# Patient Record
Sex: FEMALE | Race: WHITE
[De-identification: names, ages, dates, MRNs, and addresses within clinical notes are randomized per-mention and may not be internally consistent; named-entity substitution may affect disease eponyms.]

---

## 2017-05-15 ENCOUNTER — HOSPITAL ENCOUNTER (OUTPATIENT)
Dept: HOSPITAL 80 - FIMAGING | Age: 72
End: 2017-05-15
Attending: FAMILY MEDICINE
Payer: COMMERCIAL

## 2017-05-15 DIAGNOSIS — R14.0: ICD-10-CM

## 2017-05-15 DIAGNOSIS — R10.13: Primary | ICD-10-CM

## 2017-05-15 DIAGNOSIS — Z87.42: ICD-10-CM

## 2017-05-17 ENCOUNTER — HOSPITAL ENCOUNTER (OUTPATIENT)
Dept: HOSPITAL 80 - FIMAGING | Age: 72
End: 2017-05-17
Attending: FAMILY MEDICINE
Payer: COMMERCIAL

## 2017-05-17 DIAGNOSIS — R14.0: Primary | ICD-10-CM

## 2017-05-17 DIAGNOSIS — M54.5: ICD-10-CM

## 2017-05-24 ENCOUNTER — HOSPITAL ENCOUNTER (EMERGENCY)
Dept: HOSPITAL 80 - FED | Age: 72
Discharge: HOME | End: 2017-05-24
Payer: COMMERCIAL

## 2017-05-24 VITALS — SYSTOLIC BLOOD PRESSURE: 122 MMHG | TEMPERATURE: 97.7 F | HEART RATE: 76 BPM | DIASTOLIC BLOOD PRESSURE: 75 MMHG

## 2017-05-24 VITALS — RESPIRATION RATE: 16 BRPM | OXYGEN SATURATION: 98 %

## 2017-05-24 DIAGNOSIS — R10.9: Primary | ICD-10-CM

## 2017-05-24 LAB
BACTERIA #/AREA URNS HPF: (no result) /HPF
COLOR UR: (no result)
MUCOUS THREADS #/AREA URNS LPF: (no result) /LPF
NITRITE UR QL STRIP: NEGATIVE
PH UR STRIP: 8 [PH] (ref 5–7.5)
RBC #/AREA URNS HPF: (no result) /HPF (ref 0–3)
SP GR UR STRIP: 1.02 (ref 1–1.03)
WBC #/AREA URNS HPF: (no result) /HPF (ref 0–3)

## 2017-05-24 NOTE — EDPHY
H & P


Time Seen by Provider: 05/24/17 18:47


HPI/ROS: 





HPI


Lower abdominal pain.





71-year-old female by private vehicle with her .  This patient reports 

that yesterday evening she felt bloated and felt like her lower abdomen was 

mildly distended.  She had some flatulence as well.  She reports that this 

continued this morning.  She reports that 2:00 p.m. she developed bilateral 

lower abdominal pain which she described as sharp and radiating somewhat to her 

back.  She describes the pain is symmetric on both sides.  She came to the 

emergency department for evaluation.  While in triage she had some nausea with 

1 episode of vomiting.  She reports that after she vomited she felt much 

better.  On my evaluation she denies any pain at this time.  Last bowel 

movement was at 1:30 p.m. today.  No bloody or melenic stool.





ROS:





Constitutional:  No fever, no chills.  No weakness.


Eyes:  No discharge.  No changes in vision.


ENT:  No sore throat.  No nasal congestion or rhinorrhea.


Respiratory:  No cough.  No shortness of breath.


Cardiac:  No chest pain, no palpitations.


Gastrointestinal:  As above, no diarrhea.


Genitourinary:  No hematuria.  No dysuria or increased frequency with urination.


Musculoskeletal:  No back pain.  No neck pain.  No myalgias or arthralgias.


Skin:  No rashes.


Neurological:  No headache.  No focal weakness or altered sensation.





Past medical history:  Endometriosis.  She has had a laparotomy for this.





Social history:  Here with her .  Nonsmoker.  No alcohol.





Physical Exam:





General Appearance:  Alert, no distress.  This patient is responding to 

questions appropriately and in full sentences.  This patient appears well-

hydrated and well-nourished.


Eyes:  Pupils equal and round no pallor or injection.  No lid edema, erythema 

or injection.


Respiratory:  There are no retractions, lungs are clear to auscultation with 

good air movement bilaterally.


Cardiovascular:  Regular rate and rhythm.  No murmur.


Gastrointestinal:  Abdomen is soft and nontender, no masses, bowel sounds 

normal.  No focal tenderness at McBurney's point.  No Pa sign.


Neurological:  Motor sensory function is grossly intact.  Cranial nerves are 

normal.  Gait is normal.


Skin:  Warm and dry, no rashes.


Musculoskeletal:  No CVA tenderness on palpation.


Extremities are symmetrical.  All joints range without pain or impingement.


Psychiatric:  No agitation.  No depression.





Database:





EKG:





Imaging:





Procedures:





Emergency department course:





Vital signs have been reviewed.  Urinalysis obtained and is unremarkable.  The 

patient has benign abdominal exam.  She has no complaints of abdominal pain at 

this time.  She does feel comfortable going home.  I feel this is reasonable.  

Follow-up and return to emergency department precautions reviewed with her.  

All of her questions were answered.  She was discharged in good condition with 

her .





Differential Diagnosis:





The differential diagnosis on this patient includes but is not limited to 

constipation, bowel gas.  Appendicitis, volvulus, ovarian torsion, unlikely.  

This represents a partial list of diagnoses considered.  These considerations 

are based on history, physical exam, past history, reassessment and diagnostic 

testing.


Constitutional: 





 Initial Vital Signs











Heart Rate  67   05/24/17 19:27


 


Respiratory Rate  16   05/24/17 19:27


 


Blood Pressure  118/77   05/24/17 19:27


 


O2 Sat (%)  98   05/24/17 19:27








 











O2 Delivery Mode               Room Air














Allergies/Adverse Reactions: 


 





nitrofurantoin [From Macrobid] Allergy (Verified 05/24/17 19:39)


 


Sulfa (Sulfonamide Antibiotics) Allergy (Verified 05/24/17 19:39)


 











Medical Decision Making





- Data Points


Laboratory Results: 





 











  05/24/17





  19:25


 


Urine Color  ALEXANDRA 





  


 


Urine Appearance  MODERATELY TURBID 





  


 


Urine pH  8.0  H 





   (5.0-7.5) 


 


Ur Specific Gravity  1.017 





   (1.002-1.030) 


 


Urine Protein  1+  H 





   (NEGATIVE) 


 


Urine Ketones  1+  H 





   (NEGATIVE) 


 


Urine Blood  NEGATIVE 





   (NEGATIVE) 


 


Urine Nitrate  NEGATIVE 





   (NEGATIVE) 


 


Urine Bilirubin  NEGATIVE 





   (NEGATIVE) 


 


Urine Urobilinogen  NEGATIVE EU EU





   (0.2-1.0) 


 


Ur Leukocyte Esterase  NEGATIVE 





   (NEGATIVE) 


 


Urine RBC  3-5 /hpf H /hpf





   (0-3) 


 


Urine WBC  1-3 /hpf /hpf





   (0-3) 


 


Ur Epithelial Cells  TRACE /lpf /lpf





   (NONE-1+) 


 


Urine Bacteria  TRACE /hpf H /hpf





   (NONE SEEN) 


 


Urine Mucus  TRACE /lpf /lpf





   (NONE-1+) 


 


Urine Glucose  NEGATIVE 





   (NEGATIVE) 














Departure





- Departure


Disposition: Home, Routine, Self-Care


Clinical Impression: 


 Resolved abdominal pain





Condition: Good


Instructions:  Acute Abdominal Pain (ED)


Additional Instructions: 


Read and follow provided instructions.





Follow-up with your primary care physician in 1-2 days for re-evaluation as 

needed.





Return to the emergency department for return of abdominal pain, vomiting, 

bloody stool or other serious concerns.


Referrals: 


Nicolle Calloway MD [Primary Care Provider] - As per Instructions

## 2017-08-19 ENCOUNTER — HOSPITAL ENCOUNTER (OUTPATIENT)
Dept: HOSPITAL 80 - FIMAGING | Age: 72
End: 2017-08-19
Attending: FAMILY MEDICINE
Payer: COMMERCIAL

## 2017-08-19 DIAGNOSIS — Z82.62: ICD-10-CM

## 2017-08-19 DIAGNOSIS — Z13.820: Primary | ICD-10-CM

## 2017-08-19 DIAGNOSIS — Z78.0: ICD-10-CM

## 2017-08-19 DIAGNOSIS — M54.5: ICD-10-CM

## 2017-08-19 DIAGNOSIS — M81.0: ICD-10-CM

## 2017-09-01 ENCOUNTER — HOSPITAL ENCOUNTER (OUTPATIENT)
Dept: HOSPITAL 80 - BHFA | Age: 72
End: 2017-09-01
Attending: INTERNAL MEDICINE
Payer: COMMERCIAL

## 2017-09-01 DIAGNOSIS — R07.9: Primary | ICD-10-CM

## 2017-09-01 DIAGNOSIS — R06.00: ICD-10-CM

## 2017-09-12 ENCOUNTER — HOSPITAL ENCOUNTER (EMERGENCY)
Dept: HOSPITAL 80 - FED | Age: 72
Discharge: HOME | End: 2017-09-12
Payer: COMMERCIAL

## 2017-09-12 VITALS
OXYGEN SATURATION: 95 % | DIASTOLIC BLOOD PRESSURE: 57 MMHG | SYSTOLIC BLOOD PRESSURE: 108 MMHG | TEMPERATURE: 97.9 F | HEART RATE: 59 BPM

## 2017-09-12 VITALS — RESPIRATION RATE: 16 BRPM

## 2017-09-12 DIAGNOSIS — E86.9: ICD-10-CM

## 2017-09-12 DIAGNOSIS — R05: Primary | ICD-10-CM

## 2017-09-12 LAB
% IMMATURE GRANULYOCYTES: 0.2 % (ref 0–1.1)
ABSOLUTE IMMATURE GRANULOCYTES: 0.01 10^3/UL (ref 0–0.1)
ABSOLUTE NRBC COUNT: 0 10^3/UL (ref 0–0.01)
ADD DIFF?: NO
ADD MORPH?: NO
ADD SCAN?: NO
ANION GAP SERPL CALC-SCNC: 10 MEQ/L (ref 8–16)
ATYPICAL LYMPHOCYTE FLAG: 10 (ref 0–99)
CALCIUM SERPL-MCNC: 9.3 MG/DL (ref 8.5–10.4)
CHLORIDE SERPL-SCNC: 95 MEQ/L (ref 97–110)
CO2 SERPL-SCNC: 24 MEQ/L (ref 22–31)
CREAT SERPL-MCNC: 0.7 MG/DL (ref 0.6–1)
ERYTHROCYTE [DISTWIDTH] IN BLOOD BY AUTOMATED COUNT: 12.2 % (ref 11.5–15.2)
FRAGMENT RBC FLAG: 0 (ref 0–99)
GFR SERPL CREATININE-BSD FRML MDRD: > 60 ML/MIN/{1.73_M2}
GLUCOSE SERPL-MCNC: 101 MG/DL (ref 70–100)
HCT VFR BLD CALC: 42.1 % (ref 38–47)
HGB BLD-MCNC: 14.6 G/DL (ref 12.6–16.3)
LEFT SHIFT FLG: 0 (ref 0–99)
LIPEMIA HEMOLYSIS FLAG: 90 (ref 0–99)
MCH RBC BLDCO QN: 31.2 PG (ref 27.9–34.1)
MCHC RBC AUTO-ENTMCNC: 34.7 G/DL (ref 32.4–36.7)
MCV RBC AUTO: 90 FL (ref 81.5–99.8)
NRBC-AUTO%: 0 % (ref 0–0.2)
PLATELET # BLD: 196 10^3/UL (ref 150–400)
PLATELET CLUMPS FLAG: 0 (ref 0–99)
PMV BLD AUTO: 10.1 FL (ref 8.7–11.7)
POTASSIUM SERPL-SCNC: 4.1 MEQ/L (ref 3.5–5.2)
RBC # BLD AUTO: 4.68 10^6/UL (ref 4.18–5.33)
SODIUM SERPL-SCNC: 129 MEQ/L (ref 134–144)
TROPONIN I SERPL-MCNC: < 0.012 NG/ML (ref 0–0.03)

## 2017-09-12 NOTE — CPEKG
Heart Rate: 65

RR Interval: 923

QRSD Interval: 86

QT Interval: 412

QTC Interval: 429

QRS Axis: 67

T Wave Axis: 63

EKG Severity - ABNORMAL ECG -

EKG Impression: ACCELERATED JUNCTIONAL ESCAPE RHYTHM

Electronically Signed By: Artis Byrd 12-Sep-2017 19:55:37

## 2017-12-06 ENCOUNTER — HOSPITAL ENCOUNTER (INPATIENT)
Dept: HOSPITAL 80 - FED | Age: 72
LOS: 2 days | Discharge: HOME | DRG: 343 | End: 2017-12-08
Attending: SURGERY | Admitting: SURGERY
Payer: COMMERCIAL

## 2017-12-06 DIAGNOSIS — K40.90: ICD-10-CM

## 2017-12-06 DIAGNOSIS — K35.80: Primary | ICD-10-CM

## 2017-12-06 DIAGNOSIS — Z87.891: ICD-10-CM

## 2017-12-06 LAB
% IMMATURE GRANULYOCYTES: 0.2 % (ref 0–1.1)
ABSOLUTE IMMATURE GRANULOCYTES: 0.03 10^3/UL (ref 0–0.1)
ABSOLUTE NRBC COUNT: 0 10^3/UL (ref 0–0.01)
ADD DIFF?: NO
ADD MORPH?: NO
ADD SCAN?: NO
ALBUMIN SERPL-MCNC: 3.9 G/DL (ref 3.5–5)
ALP SERPL-CCNC: 64 IU/L (ref 38–126)
ALT SERPL-CCNC: 41 IU/L (ref 9–52)
ANION GAP SERPL CALC-SCNC: 14 MEQ/L (ref 8–16)
AST SERPL-CCNC: 24 IU/L (ref 14–46)
ATYPICAL LYMPHOCYTE FLAG: 0 (ref 0–99)
BILIRUB SERPL-MCNC: 1.3 MG/DL (ref 0.1–1.4)
CALCIUM SERPL-MCNC: 9.2 MG/DL (ref 8.5–10.4)
CHLORIDE SERPL-SCNC: 102 MEQ/L (ref 97–110)
CO2 SERPL-SCNC: 20 MEQ/L (ref 22–31)
CREAT SERPL-MCNC: 0.7 MG/DL (ref 0.6–1)
ERYTHROCYTE [DISTWIDTH] IN BLOOD BY AUTOMATED COUNT: 13 % (ref 11.5–15.2)
FRAGMENT RBC FLAG: 0 (ref 0–99)
GFR SERPL CREATININE-BSD FRML MDRD: > 60 ML/MIN/{1.73_M2}
GLUCOSE SERPL-MCNC: 115 MG/DL (ref 70–100)
HCT VFR BLD CALC: 43.2 % (ref 38–47)
HGB BLD-MCNC: 15.3 G/DL (ref 12.6–16.3)
LEFT SHIFT FLG: 0 (ref 0–99)
LIPEMIA HEMOLYSIS FLAG: 90 (ref 0–99)
MCH RBC BLDCO QN: 32.1 PG (ref 27.9–34.1)
MCHC RBC AUTO-ENTMCNC: 35.4 G/DL (ref 32.4–36.7)
MCV RBC AUTO: 90.8 FL (ref 81.5–99.8)
NRBC-AUTO%: 0 % (ref 0–0.2)
PLATELET # BLD: 178 10^3/UL (ref 150–400)
PLATELET CLUMPS FLAG: 0 (ref 0–99)
PMV BLD AUTO: 10.2 FL (ref 8.7–11.7)
POTASSIUM SERPL-SCNC: 4 MEQ/L (ref 3.5–5.2)
PROT SERPL-MCNC: 6.4 G/DL (ref 6.3–8.2)
RBC # BLD AUTO: 4.76 10^6/UL (ref 4.18–5.33)
SODIUM SERPL-SCNC: 136 MEQ/L (ref 134–144)

## 2017-12-06 PROCEDURE — G0378 HOSPITAL OBSERVATION PER HR: HCPCS

## 2017-12-06 RX ADMIN — SODIUM CHLORIDE SCH MLS: 900 INJECTION, SOLUTION INTRAVENOUS at 16:50

## 2017-12-06 RX ADMIN — KETOROLAC TROMETHAMINE SCH MG: 15 INJECTION, SOLUTION INTRAMUSCULAR; INTRAVENOUS at 17:33

## 2017-12-06 RX ADMIN — KETOROLAC TROMETHAMINE SCH: 15 INJECTION, SOLUTION INTRAMUSCULAR; INTRAVENOUS at 16:45

## 2017-12-06 RX ADMIN — SODIUM CHLORIDE SCH MLS: 900 INJECTION, SOLUTION INTRAVENOUS at 10:53

## 2017-12-06 RX ADMIN — ACETAMINOPHEN SCH: 325 TABLET ORAL at 16:45

## 2017-12-06 RX ADMIN — ACETAMINOPHEN SCH MG: 325 TABLET ORAL at 18:27

## 2017-12-06 NOTE — POSTOPPROG
Post Op Note


Date of Operation: 12/06/17


Surgeon: Ras Garcia


Anesthesia: GET(General Endotracheal), LMA


Pre-op Diagnosis: acute appendicitis


Post-op Diagnosis: acute appendicitis with small right inguinal hernia


Indication: acute appendicitis


Procedure: laparoscopic appendectomy


Findings: acute appendicitis with small right inguinal hernia


Inf/Abcess present in the surg proc area at time of surgery?: No


EBL: Minimal


Total fluids administered: 1200cc in OR 1500 cc in ER


Complications: 





none


Drains: Ernie Veras


Specimen(s): 





appendix


peritoneal fluid for culture

## 2017-12-06 NOTE — EDPHY
H & P


Time Seen by Provider: 12/06/17 07:45


HPI/ROS: 





CHIEF COMPLAINT:  Right lower quadrant pain





HISTORY OF PRESENT ILLNESS:  72-year-old female with a history of endometriosis 

presents with right lower quadrant pain.  Onset of right lower quadrant pain 

yesterday morning.  The pain is moderate and waxes and wanes.  Radiates to the 

low back.  Associated with nausea and 1 episode of vomiting.  No known fever.  

No prior similar symptoms.





REVIEW OF SYSTEMS:


Constitutional:  No fever, no chills


Eyes:  No visual changes


ENT:  No sore throat


Respiratory:  No cough, no shortness of breath


Cardiac:  No chest pain


Genitourinary:  No hematuria, no dysuria


Musculoskeletal:  No leg pain or swelling


Skin:  No rash


Neurological:  No headache, no weakness


Psychiatric:  No depression





Past Medical/Surgical History: 





Endometriosis





Social History: 











Smoking Status: Former smoker


Physical Exam: 





General Appearance:  Alert, pleasant


Eyes:  Pupils equal and round, no conjunctival pallor


ENT, Mouth:  Mucous membranes moist


Neck:  Normal inspection


Respiratory:  Lungs are clear to auscultation


Cardiovascular:  Regular rate and rhythm


Gastrointestinal:  Abdomen is soft, right lower quadrant tenderness


Neurological:  A&O, nonfocal, normal gait


Skin:  Warm and dry, no rash


Extremities:  Nontender, no pedal edema


Psychiatric:  Mood and affect normal





Constitutional: 


 Initial Vital Signs











Temperature (C)  36.6 C   12/06/17 07:16


 


Heart Rate  82   12/06/17 07:16


 


Respiratory Rate  16   12/06/17 07:16


 


Blood Pressure  98/59 L  12/06/17 07:16


 


O2 Sat (%)  97   12/06/17 07:16








 











O2 Delivery Mode               Room Air














Allergies/Adverse Reactions: 


 





nitrofurantoin [From Macrobid] Allergy (Verified 05/24/17 19:39)


 


Penicillins Allergy (Verified 12/06/17 07:15)


 


Sulfa (Sulfonamide Antibiotics) Allergy (Verified 05/24/17 19:39)


 








Home Medications: 














 Medication  Instructions  Recorded


 


NK [No Known Home Meds]  09/12/17














Medical Decision Making





- Diagnostics


Imaging Results: 


 Imaging Impressions





Abdomen CT  12/06/17 08:00


Impression: Appendicitis. 


 


Results discussed with Dr. Monahan at 9:25 am.


 


General information for patients regarding this examination can be found at 

[email or web address].


 


If you have questions or comments about this report, please contact me at [phone] (hospital) or [phone] ([facility]). 


 











ED Course/Re-evaluation: 





IV normal saline 1 L and Zofran 4 mg IV given.  Declines pain medication.  CT 

scan of the abdomen pelvis ordered to rule out appendicitis.





CT scan of the abdomen and pelvis read by Dr. Martinez reveals acute 

appendicitis.  Results discussed with the patient and her .  Invanz 1 g 

IV given.  Dr. Garcia was consulted and saw the patient in the emergency 

department.


Differential Diagnosis: 





Differential diagnosis includes though it is not limited to appendicitis, 

cholecystitis, diverticulitis, pyelonephritis, bowel perforation, small bowel 

obstruction.





- Data Points


Laboratory Results: 


 Laboratory Results





 12/06/17 07:40 





 12/06/17 07:40 





 











  12/06/17 12/06/17





  07:40 07:40


 


WBC    12.24 10^3/uL H 10^3/uL





    (3.80-9.50) 


 


RBC    4.76 10^6/uL 10^6/uL





    (4.18-5.33) 


 


Hgb    15.3 g/dL g/dL





    (12.6-16.3) 


 


Hct    43.2 % %





    (38.0-47.0) 


 


MCV    90.8 fL fL





    (81.5-99.8) 


 


MCH    32.1 pg pg





    (27.9-34.1) 


 


MCHC    35.4 g/dL g/dL





    (32.4-36.7) 


 


RDW    13.0 % %





    (11.5-15.2) 


 


Plt Count    178 10^3/uL 10^3/uL





    (150-400) 


 


MPV    10.2 fL fL





    (8.7-11.7) 


 


Neut % (Auto)    85.9 % H %





    (39.3-74.2) 


 


Lymph % (Auto)    8.3 % L %





    (15.0-45.0) 


 


Mono % (Auto)    5.2 % %





    (4.5-13.0) 


 


Eos % (Auto)    0.1 % L %





    (0.6-7.6) 


 


Baso % (Auto)    0.3 % %





    (0.3-1.7) 


 


Nucleat RBC Rel Count    0.0 % %





    (0.0-0.2) 


 


Absolute Neuts (auto)    10.51 10^3/uL H 10^3/uL





    (1.70-6.50) 


 


Absolute Lymphs (auto)    1.01 10^3/uL 10^3/uL





    (1.00-3.00) 


 


Absolute Monos (auto)    0.64 10^3/uL 10^3/uL





    (0.30-0.80) 


 


Absolute Eos (auto)    0.01 10^3/uL L 10^3/uL





    (0.03-0.40) 


 


Absolute Basos (auto)    0.04 10^3/uL 10^3/uL





    (0.02-0.10) 


 


Absolute Nucleated RBC    0.00 10^3/uL 10^3/uL





    (0-0.01) 


 


Immature Gran %    0.2 % %





    (0.0-1.1) 


 


Immature Gran #    0.03 10^3/uL 10^3/uL





    (0.00-0.10) 


 


Sodium  136 mEq/L mEq/L  





   (134-144)  


 


Potassium  4.0 mEq/L mEq/L  





   (3.5-5.2)  


 


Chloride  102 mEq/L mEq/L  





   ()  


 


Carbon Dioxide  20 mEq/l L mEq/l  





   (22-31)  


 


Anion Gap  14 mEq/L mEq/L  





   (8-16)  


 


BUN  10 mg/dL mg/dL  





   (7-23)  


 


Creatinine  0.7 mg/dL mg/dL  





   (0.6-1.0)  


 


Estimated GFR  > 60   





   


 


Glucose  115 mg/dL H mg/dL  





   ()  


 


Calcium  9.2 mg/dL mg/dL  





   (8.5-10.4)  


 


Total Bilirubin  1.3 mg/dL mg/dL  





   (0.1-1.4)  


 


AST  24 IU/L IU/L  





   (14-46)  


 


ALT  41 IU/L IU/L  





   (9-52)  


 


Alkaline Phosphatase  64 IU/L IU/L  





   ()  


 


Total Protein  6.4 g/dL g/dL  





   (6.3-8.2)  


 


Albumin  3.9 g/dL g/dL  





   (3.5-5.0)  











Medications Given: 


 








Discontinued Medications





Ertapenem (Invanz)  1 gm IVP EDNOW ONE


   PRN Reason: Protocol


   Stop: 12/06/17 09:32


   Last Admin: 12/06/17 10:07 Dose:  1 gm


Sodium Chloride (Ns)  1,000 mls @ 0 mls/hr IV EDNOW ONE; Wide Open


   PRN Reason: Protocol


   Stop: 12/06/17 07:54


   Last Admin: 12/06/17 08:37 Dose:  1,000 mls


Ondansetron HCl (Zofran)  4 mg IVP EDNOW ONE


   Stop: 12/06/17 07:54


   Last Admin: 12/06/17 08:36 Dose:  4 mg








Departure





- Departure


Disposition: [facility]


Clinical Impression: 


Acute appendicitis


Qualifiers:


 Acute appendicitis type: with localized peritonitis Qualified Code(s): K35.3 - 

Acute appendicitis with localized peritonitis





Condition: Good

## 2017-12-06 NOTE — GOP
[f 
rep st]



                                                                OPERATIVE REPORT





DATE OF OPERATION:  12/06/2017



SURGEON:  Ras Garcia MD



PREOPERATIVE DIAGNOSIS:  Acute appendicitis.



POSTOPERATIVE DIAGNOSIS:  Acute appendicitis (unruptured) with small right 
indirect inguinal hernia.



PROCEDURE PERFORMED:  



FINDINGS:  Acute unruptured appendicitis with right indirect inguinal hernia.





INDICATIONS:  Acute appendicitis.



DESCRIPTION OF PROCEDURE:  The patient was placed on the operating table in 
supine position.  She has received 1 g of Invanz.  Her allergy to "penicillin" 
is by scratch test and is thought to be more mold related. 



A surgical time-out was carried out.  The patient was carefully intubated, 
prepped and draped. 



A curvilinear incision was planned at the umbilicus.  This incision of skin was 
sharply incised.  Dissection was continued down to the anterior rectus sheath 
which was elevated between Allis clamps.  It was divided in the midline.  A 
pursestring of #0 PDS was placed.  The peritoneum was entered.  An 11-12 mm 
disposable Marybel trocar is placed.  Intra-abdominal insufflation was carried 
out to 15 mmHg.  This was done at high flow.  A 5 mm oblique left lower 
quadrant incision was made.  A vertical midline suprapubic incision is made.  
The 5 mm ports were placed to the latter 2 sites. 



There is a slightly turbid, green-yellow fluid in the pelvis.  This was 
aspirated and sent for cultures. 



The appendix was adherent to the right sidewall.  Using a Harmonic scalpel, the 
cecum was carefully mobilized.  The appendix was carefully taken down from its 
adhesions to the sidewall. 



Harmonic scalpel was used to transect the mesoappendix down to its base on the 
cecum. 



The appendix was elevated.  The appendix was transected with a small cuff of 
cecum.  It was removed in a specimen bag.  Hemostasis was excellent. 



Photographic documentation of the right inguinal hernia, both ovaries and the 
uterus was carried out.  The small bowel was run for a distance of 
approximately 3 feet.  There was no evidence of mesenteric adenitis or a Meckel 
diverticulum. 



The abdominal cavity was well irrigated with heparin and Ancef containing 
irrigant. 



A flat VERONICA drain was placed in the pelvis with the drain tubing carried out 
through the left lower quadrant 5 mm port.  It was sutured at the skin level 
with a 3-0 silk suture.  The other port is removed under direct vision.  The 
intraabdominal insufflation was released.  The midline fascial defect has a 
simple suture placed at its midpoint.  The pursestring is tied and then the 2nd 
suture was tied.  Irrigation of subcutaneous tissue was carried out.  
Hemostasis was excellent.  The skin was closed at all sites with inverted 
simple sutures of #4-0 Vicryl.  Mastisol and Steri-Strips were placed.  Band-
Aids were placed over the umbilical and suprapubic incisions.  4x4s and 
Tegaderm were placed over the drain site.  The patient is transferred to 
recovery in stable and satisfactory condition.





Job #:  [id number]

MTDD

## 2017-12-06 NOTE — PDANEPAE
ANE Past Medical History





- Cardiovascular History


Hx Hypertension: No


Hx Arrhythmias: No


Hx Chest Pain: No


Hx Coronary Artery / Peripheral Vascular Disease: No


Hx CHF / Valvular Disease: No


Hx Palpitations: No





- Pulmonary History


Hx COPD: No


Hx Asthma/Reactive Airway Disease: No


Hx Recent Upper Respiratory Infection: No


Hx Oxygen in Use at Home: No


Hx Sleep Apnea: No





- Endocrine History


Hx Diabetes: No


Hypothyroid: No


Hyperthyroid: No


Obesity: no





ANE Review of Systems


Review of Systems: 








ANE Patient History





- Allergies


Allergies/Adverse Reactions: 








nitrofurantoin [From Macrobid] Allergy (Verified 12/06/17 12:19)


 UPSET STOMACH


Penicillins Allergy (Verified 12/06/17 12:19)


 COUGH


Sulfa (Sulfonamide Antibiotics) Allergy (Verified 12/06/17 12:19)


 UPSET STOMACH








- Home Medications


Home Medications: 








Herbals/Supplements -Info Only 1 ea PO DAILY 12/06/17 [Last Taken Unknown]








- NPO status


NPO Since - Liquids (Date): 12/06/17


NPO Since - Liquids (Time): 06:30


NPO Since - Solids (Date): 12/05/17


NPO Since - Solids (Time): 08:00





- Smoking Hx


Smoking Status: Former smoker





ANE Labs/Vital Signs





- Labs


Result Diagrams: 


 12/06/17 07:40





 12/06/17 07:40





- Vital Signs


Blood Pressure: 114/51


Heart Rate: 70


Respiratory Rate: 16


O2 Sat (%): 98


Height: 172.72 cm


Weight: 62.142 kg





ANE Physical Exam





- Airway


Neck exam: FROM


Mallampati Score: Class 1


Mouth exam: normal dental/mouth exam





- Pulmonary


Pulmonary: no respiratory distress





- Cardiovascular


Cardiovascular: regular rate and rhythym





- ASA Status


ASA Status: II, E





ANE Anesthesia Plan


Anesthesia Plan: general endotracheal anesthesia

## 2017-12-07 LAB
% IMMATURE GRANULYOCYTES: 0.3 % (ref 0–1.1)
ABSOLUTE IMMATURE GRANULOCYTES: 0.02 10^3/UL (ref 0–0.1)
ABSOLUTE NRBC COUNT: 0 10^3/UL (ref 0–0.01)
ADD DIFF?: NO
ADD MORPH?: NO
ADD SCAN?: NO
ATYPICAL LYMPHOCYTE FLAG: 0 (ref 0–99)
ERYTHROCYTE [DISTWIDTH] IN BLOOD BY AUTOMATED COUNT: 13.5 % (ref 11.5–15.2)
FRAGMENT RBC FLAG: 0 (ref 0–99)
HCT VFR BLD CALC: 36.4 % (ref 38–47)
HGB BLD-MCNC: 12.4 G/DL (ref 12.6–16.3)
LEFT SHIFT FLG: 0 (ref 0–99)
LIPEMIA HEMOLYSIS FLAG: 90 (ref 0–99)
MCH RBC BLDCO QN: 32.5 PG (ref 27.9–34.1)
MCHC RBC AUTO-ENTMCNC: 34.1 G/DL (ref 32.4–36.7)
MCV RBC AUTO: 95.3 FL (ref 81.5–99.8)
NRBC-AUTO%: 0 % (ref 0–0.2)
PLATELET # BLD: 136 10^3/UL (ref 150–400)
PLATELET CLUMPS FLAG: 10 (ref 0–99)
PMV BLD AUTO: 10.2 FL (ref 8.7–11.7)
RBC # BLD AUTO: 3.82 10^6/UL (ref 4.18–5.33)

## 2017-12-07 PROCEDURE — 0DTJ4ZZ RESECTION OF APPENDIX, PERCUTANEOUS ENDOSCOPIC APPROACH: ICD-10-PCS | Performed by: SURGERY

## 2017-12-07 RX ADMIN — ERTAPENEM SODIUM SCH GM: 1 INJECTION, POWDER, LYOPHILIZED, FOR SOLUTION INTRAMUSCULAR; INTRAVENOUS at 09:23

## 2017-12-07 RX ADMIN — ACETAMINOPHEN SCH MG: 325 TABLET ORAL at 11:33

## 2017-12-07 RX ADMIN — ACETAMINOPHEN SCH MG: 325 TABLET ORAL at 20:21

## 2017-12-07 RX ADMIN — KETOROLAC TROMETHAMINE SCH MG: 15 INJECTION, SOLUTION INTRAMUSCULAR; INTRAVENOUS at 11:36

## 2017-12-07 RX ADMIN — ACETAMINOPHEN SCH: 325 TABLET ORAL at 05:51

## 2017-12-07 RX ADMIN — SODIUM CHLORIDE SCH MLS: 900 INJECTION, SOLUTION INTRAVENOUS at 05:30

## 2017-12-07 RX ADMIN — KETOROLAC TROMETHAMINE SCH MG: 15 INJECTION, SOLUTION INTRAMUSCULAR; INTRAVENOUS at 23:45

## 2017-12-07 RX ADMIN — KETOROLAC TROMETHAMINE SCH MG: 15 INJECTION, SOLUTION INTRAMUSCULAR; INTRAVENOUS at 05:30

## 2017-12-07 RX ADMIN — KETOROLAC TROMETHAMINE SCH MG: 15 INJECTION, SOLUTION INTRAMUSCULAR; INTRAVENOUS at 00:11

## 2017-12-07 RX ADMIN — KETOROLAC TROMETHAMINE SCH MG: 15 INJECTION, SOLUTION INTRAMUSCULAR; INTRAVENOUS at 17:36

## 2017-12-07 NOTE — SOAPPROG
SOAP Progress Note


Assessment/Plan: 


POD#1





12/07/17 15:16





Assessment:


Passing flatus but c/o of bloating.


WBC improved.


VERONICA drainage down and clear.





Plan:


VERONICA removed


Will observe over night.


decision for outpatient oral antibiotics will be based on microbiology results








Subjective: 





I feel bloated


Objective: 





 Vital Signs











Temp Pulse Resp BP Pulse Ox


 


 36.7 C   66   14   93/43 L  89 L


 


 12/07/17 12:20  12/07/17 12:20  12/07/17 12:20  12/07/17 12:20  12/07/17 12:20








 Laboratory Results





 12/07/17 04:34 





 











 12/06/17 12/07/17 12/08/17





 05:59 05:59 05:59


 


Intake Total  4590 


 


Output Total  1220 610


 


Balance  3370 -610














- Time Spent With Patient


Time Spent With Patient: 





25





Physical Exam





- Physical Exam


General Appearance: WD/WN, alert, mild distress


Neck: non-tender, full range of motion, supple, normal inspection


Respiratory: chest non-tender, lungs clear, normal breath sounds


Cardiac/Chest: regular rate, rhythm


Abdomen: normal bowel sounds ( but hypoactive), soft, other (incisions clean 

and dry, VERONICA removed)


Pelvic Exam: deferred


Rectal: deferred


Back: Normal inspection


Skin: normal color, warm/dry


Neuro/Psych: no motor/sensory deficits, alert, normal mood/affect, oriented x 3





ICD10 Worksheet


Patient Problems: 


 Problems











Problem Status Onset


 


Acute appendicitis Acute

## 2017-12-07 NOTE — ASMTCMCOM
CM Note

 

CM Note                       

Notes:

Pt admitted for appy. She will have no DC needs.

 

Date Signed:  12/07/2017 11:36 AM

Electronically Signed By:Lupe Umana LCSW

## 2017-12-08 VITALS
SYSTOLIC BLOOD PRESSURE: 103 MMHG | OXYGEN SATURATION: 98 % | DIASTOLIC BLOOD PRESSURE: 65 MMHG | RESPIRATION RATE: 17 BRPM | HEART RATE: 50 BPM

## 2017-12-08 VITALS — TEMPERATURE: 97.8 F

## 2017-12-08 RX ADMIN — KETOROLAC TROMETHAMINE SCH MG: 15 INJECTION, SOLUTION INTRAMUSCULAR; INTRAVENOUS at 05:35

## 2017-12-08 RX ADMIN — ACETAMINOPHEN SCH MG: 325 TABLET ORAL at 11:30

## 2017-12-08 RX ADMIN — KETOROLAC TROMETHAMINE SCH MG: 15 INJECTION, SOLUTION INTRAMUSCULAR; INTRAVENOUS at 11:33

## 2017-12-08 RX ADMIN — ERTAPENEM SODIUM SCH GM: 1 INJECTION, POWDER, LYOPHILIZED, FOR SOLUTION INTRAMUSCULAR; INTRAVENOUS at 09:43

## 2017-12-08 RX ADMIN — ACETAMINOPHEN SCH MG: 325 TABLET ORAL at 05:33

## 2018-05-04 ENCOUNTER — HOSPITAL ENCOUNTER (OUTPATIENT)
Dept: HOSPITAL 80 - BMCIMAGING | Age: 73
End: 2018-05-04
Attending: PODIATRIST
Payer: COMMERCIAL

## 2018-05-04 DIAGNOSIS — M79.672: Primary | ICD-10-CM
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
## 2018-06-15 ENCOUNTER — HOSPITAL ENCOUNTER (OUTPATIENT)
Dept: HOSPITAL 80 - FIMAGING | Age: 73
End: 2018-06-15
Attending: INTERNAL MEDICINE
Payer: COMMERCIAL

## 2018-06-15 DIAGNOSIS — R14.0: Primary | ICD-10-CM
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
